# Patient Record
(demographics unavailable — no encounter records)

---

## 2024-12-06 NOTE — DISCUSSION/SUMMARY
[FreeTextEntry1] : This seems like a straightforward diagnosis.  My only problem is that it happened on more than 1 occasion in the past.  She is perfectly fine right now.  I told the parents that if this happens again we should have her checked by orthopedics.

## 2024-12-06 NOTE — HISTORY OF PRESENT ILLNESS
[FreeTextEntry6] : The patient is complaining of neck pain.  She has had it for the past few days.  Mom says she is not moving her neck completely.  There is no history of trauma.  There is no recent injury of any sort.  There is no fever or any signs of viral illness.  Of interest is that the patient has had similar episodes in the past.  We have diagnosed her with torticollis in the past she had been to an urgent care at 1 point and was also diagnosed with torticollis.

## 2024-12-06 NOTE — PHYSICAL EXAM
[Clear Rhinorrhea] : clear rhinorrhea [Supple] : supple [NL] : warm, clear [FreeTextEntry3] : TMs are perfect bilaterally [de-identified] : There is full range of motion of the neck.  There is absolutely no limitation at all.  There is no discomfort on range of motion. [de-identified] : Full flexion of hips without any resistance.  No pain when flexing hips.

## 2025-01-27 NOTE — HISTORY OF PRESENT ILLNESS
[FreeTextEntry6] : Patient got sick 3 days ago.  She was diagnosed with influenza on a home test.  Her sister also had the flu.  Her temperature was up to 104.  She still had high fever yesterday.  Today her temperature is down.  This morning, she is complaining that her neck hurts.  She also says her ear is bothering her.

## 2025-04-03 NOTE — HISTORY OF PRESENT ILLNESS
[FreeTextEntry6] : Fever since yesterday.  Also c/o of sore throat.  minimal congestion. Last got medicine at 7am.    Mom also concerned about a skin rash that has been present for over a year.  Large patches of hypopigmented skin.  pt does have eczema but these patches on the legs do not bother her as much.

## 2025-04-03 NOTE — PHYSICAL EXAM
[NL] : left tympanic membrane clear, right tympanic membrane clear [Erythematous Oropharynx] : erythematous oropharynx [Clear to Auscultation Bilaterally] : clear to auscultation bilaterally [Regular Rate and Rhythm] : regular rate and rhythm [Normal S1, S2 audible] : normal S1, S2 audible [Hypopigmented] : hypopigmented [Enlarged] : enlarged [Posterior Cervical] : posterior cervical [Conjuctival Injection] : no conjunctival injection [Murmur] : no murmur [de-identified] : large target like, hypopigmented patch over anterior surface of RLL; few hypopigmented patches over L lower leg; some palpable dryness

## 2025-04-11 NOTE — HISTORY OF PRESENT ILLNESS
[FreeTextEntry6] : Patient is here for follow-up.  She was seen in urgent care 2 days ago because of a reaction to medication.  She was diagnosed as serum sickness.  She was told to take Motrin for her pain and antibiotic was stopped and switched to cefdinir.  She is here for follow-up.  Presently, she feels fine.  Nothing hurts.

## 2025-04-11 NOTE — PHYSICAL EXAM
[NL] : moves all extremities x4, warm, well perfused x4 [FreeTextEntry3] : Ems are perfect bilaterally [de-identified] : Full range of motion of major joints.  No pain or tenderness to palpation. [de-identified] : There are some hives on the left arm.  Stroking of the skin caused dermatographia some

## 2025-05-30 NOTE — PHYSICAL EXAM
[NL] : moves all extremities x4, warm, well perfused x4 [FreeTextEntry3] : Tms are perfect bilaterally [de-identified] : Throat is somewhat red [de-identified] : Full range of motion of major joints.  No pain or tenderness to palpation. [de-identified] : Typical lesions of coxsackie on palms and also dorsum of hands

## 2025-05-30 NOTE — PHYSICAL EXAM
[NL] : moves all extremities x4, warm, well perfused x4 [FreeTextEntry3] : Tms are perfect bilaterally [de-identified] : Throat is somewhat red [de-identified] : Full range of motion of major joints.  No pain or tenderness to palpation. [de-identified] : Typical lesions of coxsackie on palms and also dorsum of hands

## 2025-05-30 NOTE — HISTORY OF PRESENT ILLNESS
[FreeTextEntry6] : Patient's had hand-foot-and-mouth disease for the past few days.  Her sister has it and that is why the family knows that this child has it also.  Today, she is complaining that her left ear hurts.  It is for that reason that prompted this visit.

## 2025-07-12 NOTE — PHYSICAL EXAM
[Alert] : alert [No Acute Distress] : no acute distress [Playful] : playful [Normocephalic] : normocephalic [Conjunctivae with no discharge] : conjunctivae with no discharge [PERRL] : PERRL [EOMI Bilateral] : EOMI bilateral [Auricles Well Formed] : auricles well formed [Clear Tympanic membranes with present light reflex and bony landmarks] : clear tympanic membranes with present light reflex and bony landmarks [No Discharge] : no discharge [Nares Patent] : nares patent [Pink Nasal Mucosa] : pink nasal mucosa [Palate Intact] : palate intact [Uvula Midline] : uvula midline [Nonerythematous Oropharynx] : nonerythematous oropharynx [No Caries] : no caries [Trachea Midline] : trachea midline [Supple, full passive range of motion] : supple, full passive range of motion [No Palpable Masses] : no palpable masses [Symmetric Chest Rise] : symmetric chest rise [Clear to Auscultation Bilaterally] : clear to auscultation bilaterally [Normoactive Precordium] : normoactive precordium [Regular Rate and Rhythm] : regular rate and rhythm [Normal S1, S2 present] : normal S1, S2 present [No Murmurs] : no murmurs [+2 Femoral Pulses] : +2 femoral pulses [Soft] : soft [NonTender] : non tender [Non Distended] : non distended [No Hepatomegaly] : no hepatomegaly [No Splenomegaly] : no splenomegaly [Hua 1] : Hua 1 [Patent] : patent [Normally Placed] : normally placed [No Abnormal Lymph Nodes Palpated] : no abnormal lymph nodes palpated [No Gait Asymmetry] : no gait asymmetry [Normal Muscle Tone] : normal muscle tone [Straight] : straight [Cranial Nerves Grossly Intact] : cranial nerves grossly intact [No Rash or Lesions] : no rash or lesions

## 2025-07-14 NOTE — HISTORY OF PRESENT ILLNESS
[Mother] : mother [Normal] : Normal [Brushing teeth] : Brushing teeth [Yes] : Patient goes to dentist yearly [Vitamin] : Primary Fluoride Source: Vitamin [Appropiate parent-child communication] : Appropriate parent-child communication [Parent has appropriate responses to behavior] : Parent has appropriate responses to behavior [No] : No cigarette smoke exposure [de-identified] : Regular for age  [de-identified] : No risks identified  [YES] : Yes [Are there any unlocked firearms stored in your household?] : No unlocked firearms in the household. [Are there any firearms stored in your household that are loaded?] : No firearms are stored in the household loaded. [Are there any children in your household?] : There are children in the household. [Has anyone in the household been feeling low/depressed/been struggling?] : No one in the household has been feeling low/depressed/been struggling. [Have you attended a firearm safety workshop or class?] : A firearm safety workshop or class has been attended.

## 2025-07-14 NOTE — HISTORY OF PRESENT ILLNESS
[Mother] : mother [Normal] : Normal [Brushing teeth] : Brushing teeth [Yes] : Patient goes to dentist yearly [Vitamin] : Primary Fluoride Source: Vitamin [Appropiate parent-child communication] : Appropriate parent-child communication [Parent has appropriate responses to behavior] : Parent has appropriate responses to behavior [No] : No cigarette smoke exposure [de-identified] : Regular for age  [de-identified] : No risks identified  [YES] : Yes [Are there any unlocked firearms stored in your household?] : No unlocked firearms in the household. [Are there any firearms stored in your household that are loaded?] : No firearms are stored in the household loaded. [Are there any children in your household?] : There are children in the household. [Has anyone in the household been feeling low/depressed/been struggling?] : No one in the household has been feeling low/depressed/been struggling. [Have you attended a firearm safety workshop or class?] : A firearm safety workshop or class has been attended.